# Patient Record
Sex: MALE | Race: WHITE | Employment: FULL TIME | URBAN - METROPOLITAN AREA
[De-identification: names, ages, dates, MRNs, and addresses within clinical notes are randomized per-mention and may not be internally consistent; named-entity substitution may affect disease eponyms.]

---

## 2018-05-31 ENCOUNTER — HOSPITAL ENCOUNTER (OUTPATIENT)
Dept: MRI IMAGING | Age: 53
Discharge: HOME | End: 2018-05-31
Attending: NEUROLOGICAL SURGERY
Payer: COMMERCIAL

## 2018-05-31 DIAGNOSIS — M51.36 DEGENERATIVE LUMBAR DISC: ICD-10-CM

## 2018-05-31 PROCEDURE — 72148 MRI LUMBAR SPINE W/O DYE: CPT

## 2025-07-30 ENCOUNTER — OFFICE VISIT (OUTPATIENT)
Dept: FAMILY MEDICINE | Facility: CLINIC | Age: 60
End: 2025-07-30
Payer: COMMERCIAL

## 2025-07-30 VITALS
DIASTOLIC BLOOD PRESSURE: 90 MMHG | RESPIRATION RATE: 18 BRPM | OXYGEN SATURATION: 95 % | SYSTOLIC BLOOD PRESSURE: 150 MMHG | HEART RATE: 86 BPM | TEMPERATURE: 98 F

## 2025-07-30 DIAGNOSIS — M54.50 ACUTE RIGHT-SIDED LOW BACK PAIN WITHOUT SCIATICA: Primary | ICD-10-CM

## 2025-07-30 RX ORDER — TIZANIDINE 4 MG/1
4 TABLET ORAL EVERY 6 HOURS PRN
Qty: 30 TABLET | Refills: 0 | Status: SHIPPED | OUTPATIENT
Start: 2025-07-30 | End: 2025-08-09

## 2025-07-30 RX ORDER — METHYLPREDNISOLONE 4 MG/1
TABLET ORAL
Qty: 21 TABLET | Refills: 0 | Status: SHIPPED | OUTPATIENT
Start: 2025-07-30 | End: 2025-08-05

## 2025-07-30 RX ORDER — KETOROLAC TROMETHAMINE 30 MG/ML
30 INJECTION, SOLUTION INTRAMUSCULAR; INTRAVENOUS ONCE
Status: COMPLETED | OUTPATIENT
Start: 2025-07-30 | End: 2025-07-30

## 2025-07-30 RX ADMIN — KETOROLAC TROMETHAMINE 30 MG: 30 INJECTION, SOLUTION INTRAMUSCULAR; INTRAVENOUS at 09:14

## 2025-07-30 ASSESSMENT — PAIN SCALES - GENERAL: PAINLEVEL_OUTOF10: 6

## 2025-07-31 NOTE — PROGRESS NOTES
Scot Lebron  1965    Subjective     CC:  Chief Complaint   Patient presents with    Back Pain     Lumbar pain X 1 day no known injury. HX herniated L4, L5 around 2006. Has had periodic spasms since.        HPI:  History of Present Illness    Scot Lebron is a 59 year old male with a history of herniated disc who presents with acute low back pain and muscle spasms.    He experiences acute low back pain primarily located above the buttocks, without radiation down the legs. The pain causes muscle spasms and worsens with activities such as sitting and bending. Walking and standing, particularly in a pool, provide relief. 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    He manages his symptoms with tizanidine 4 mg at night and Motrin during the day, avoiding tizanidine during the day due to its sedative effects. He has previously undergone physical therapy and received corticosteroid injections, which he found beneficial, although he had a negative experience with an injection near the tailbone.    He has high blood pressure, managed with medication, and is slightly diabetic, managed with metformin. He also takes testosterone. 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    No pain with urination, history of kidney stones, fever, or chills. Sitting exacerbates his low back pain, and he uses a stand-up desk at work to mitigate this.           The following were reviewed and discussed and updated as appropriate:          Problem List:  There is no problem list on file for this patient.      Past Medical History:  No past medical history on file.    No past surgical history on file.    Social History:      Family History:  No family history on file.    Allergies:  No Known Allergies    Medications:    No current outpatient medications on file prior to visit.     No current facility-administered medications on file prior to visit.       Review of Systems:  Generalized: No fevers or chills  HEENT: No eye pain, no ear pain  CV: No chest pain  Pulm: No shortness of breath, no cough, no wheezing  GI: No abdominal pain, no constipation, no nausea vomiting or diarrhea  MSK: + R sided low back pain w/o radiation into the leg  Derm: No rashes      Objective   EXAM:  /90 (BP Location: Left arm, Patient Position: Sitting, Cuff Size: Large Adult)   Pulse 86   Temp 36.7 °C (98 °F) (Oral)   Resp 18   SpO2 95%   Physical Exam  Constitutional:       General: No acute distress.     Appearance: Normal appearance. Not ill-appearing or toxic-appearing.   EENT:  PERRLA, normal conjunctiva, ear canals clear with no erythema, posterior pharynx clear  Cardiovascular:      Rate and Rhythm: Normal rate and regular rhythm.   Pulmonary:      Effort: Pulmonary effort is normal. Lungs CTA in all lung fields.  Abdominal:      General: Abdomen is flat. There is no distension. No TTP.   Musculoskeletal:         General: Ambulates with a mild limp hunched over to his right side.  No spinal tenderness palpation.  Skin:     General: Skin is warm.   Neurological:      General: No focal deficit present.      Mental Status: Alert and oriented.         Assessment/Plan   A/P/Discussion:  No problem-specific Assessment & Plan notes found for this encounter.    Assessment/Plan     Low back pain with muscle spasm  Chronic low back pain with muscle spasms, exacerbated by recent travel and poor sleeping conditions. No sciatica or kidney stones. Pain aggravated by sitting, relieved by walking and stretching.  - Administered Toradol injection for pain relief, avoid ibuprofen for two days.  - Continue tizanidine for muscle relaxation, especially at night.  - Prescribe Solu Medrol Dosepak to reduce inflammation, monitor blood sugar.  - Advise  increased water intake to protect kidneys while on Toradol.  - Encourage stretching exercises and walking as tolerated.    Type 2 diabetes mellitus without complications  Type 2 diabetes mellitus managed with metformin. Discussed potential for increased blood sugar with steroid use.  - Monitor blood sugar levels, especially while on Solu Medrol Dosepak.  - Maintain low sugar diet and regular physical activity.         ORDERS:  Diagnoses and all orders for this visit:    Acute right-sided low back pain without sciatica  -     methylPREDNISolone (MEDROL DOSEPACK) 4 mg tablet; follow package directions  -     tiZANidine (Zanaflex) 4 mg tablet; Take 1 tablet (4 mg total) by mouth every 6 hours as needed for muscle spasms for up to 10 days  -     ketorolac (TORADOL) injection 30 mg           Electronically signed by: Josh Beltran DO  7/31/2025  12:30 PM